# Patient Record
Sex: MALE
[De-identification: names, ages, dates, MRNs, and addresses within clinical notes are randomized per-mention and may not be internally consistent; named-entity substitution may affect disease eponyms.]

---

## 2018-03-17 ENCOUNTER — HOSPITAL ENCOUNTER (EMERGENCY)
Dept: HOSPITAL 90 - EDH | Age: 48
Discharge: HOME | End: 2018-03-17
Payer: COMMERCIAL

## 2018-03-17 DIAGNOSIS — I25.2: ICD-10-CM

## 2018-03-17 DIAGNOSIS — R07.9: Primary | ICD-10-CM

## 2018-03-17 DIAGNOSIS — E78.5: ICD-10-CM

## 2018-03-17 DIAGNOSIS — I25.10: ICD-10-CM

## 2018-03-17 DIAGNOSIS — I10: ICD-10-CM

## 2018-03-17 LAB
BASOPHILS NFR BLD AUTO: 0.2 % (ref 0–5)
CK MB SERPL-MCNC: 2.9 NG/ML (ref 0.5–3.6)
CK SERPL-CCNC: 246 U/L (ref 21–232)
EOSINOPHIL NFR BLD AUTO: 1.4 % (ref 0–8)
ERYTHROCYTE [DISTWIDTH] IN BLOOD BY AUTOMATED COUNT: 14.7 % (ref 11–15.5)
HCT VFR BLD AUTO: 42.4 % (ref 42–54)
LIPASE SERPL-CCNC: 105 U/L (ref 114–286)
LYMPHOCYTES NFR SPEC AUTO: 6.2 % (ref 21–51)
MCH RBC QN AUTO: 28.4 PG (ref 27–33)
MCHC RBC AUTO-ENTMCNC: 33.2 G/DL (ref 32–36)
MCV RBC AUTO: 85.5 FL (ref 79–99)
MONOCYTES NFR BLD AUTO: 6.8 % (ref 3–13)
NEUTROPHILS NFR BLD AUTO: 85.4 % (ref 40–77)
NRBC BLD MANUAL-RTO: 0 % (ref 0–0.19)
PLATELET # BLD AUTO: 238 K/UL (ref 130–400)
RBC # BLD AUTO: 4.96 MIL/UL (ref 4.5–6.2)
WBC # BLD AUTO: 10.1 K/UL (ref 4.8–10.8)

## 2018-03-17 PROCEDURE — 82550 ASSAY OF CK (CPK): CPT

## 2018-03-17 PROCEDURE — 99291 CRITICAL CARE FIRST HOUR: CPT

## 2018-03-17 PROCEDURE — 84484 ASSAY OF TROPONIN QUANT: CPT

## 2018-03-17 PROCEDURE — 36415 COLL VENOUS BLD VENIPUNCTURE: CPT

## 2018-03-17 PROCEDURE — 83690 ASSAY OF LIPASE: CPT

## 2018-03-17 PROCEDURE — 93005 ELECTROCARDIOGRAM TRACING: CPT

## 2018-03-17 PROCEDURE — 96374 THER/PROPH/DIAG INJ IV PUSH: CPT

## 2018-03-17 PROCEDURE — 85025 COMPLETE CBC W/AUTO DIFF WBC: CPT

## 2018-03-17 PROCEDURE — 82553 CREATINE MB FRACTION: CPT

## 2024-12-03 ENCOUNTER — HOSPITAL ENCOUNTER (EMERGENCY)
Dept: HOSPITAL 90 - EDH | Age: 54
Discharge: HOME | End: 2024-12-03
Payer: COMMERCIAL

## 2024-12-03 VITALS — HEIGHT: 69 IN | WEIGHT: 309.97 LBS | BODY MASS INDEX: 45.91 KG/M2

## 2024-12-03 VITALS
HEART RATE: 71 BPM | OXYGEN SATURATION: 100 % | SYSTOLIC BLOOD PRESSURE: 123 MMHG | TEMPERATURE: 98.2 F | DIASTOLIC BLOOD PRESSURE: 76 MMHG | RESPIRATION RATE: 16 BRPM

## 2024-12-03 DIAGNOSIS — I10: ICD-10-CM

## 2024-12-03 DIAGNOSIS — E78.00: ICD-10-CM

## 2024-12-03 DIAGNOSIS — K29.70: ICD-10-CM

## 2024-12-03 DIAGNOSIS — K21.9: Primary | ICD-10-CM

## 2024-12-03 DIAGNOSIS — Z79.82: ICD-10-CM

## 2024-12-03 DIAGNOSIS — Z79.84: ICD-10-CM

## 2024-12-03 DIAGNOSIS — Z79.02: ICD-10-CM

## 2024-12-03 DIAGNOSIS — Z79.899: ICD-10-CM

## 2024-12-03 DIAGNOSIS — E11.9: ICD-10-CM

## 2024-12-03 LAB
BASOPHILS # BLD AUTO: 0.04 K/UL (ref 0–0.2)
BASOPHILS NFR BLD AUTO: 0.7 % (ref 0–5)
BNP SERPL-MCNC: 12 PG/ML (ref 0–100)
BUN SERPL-MCNC: 13 MG/DL (ref 7–18)
CHLORIDE SERPL-SCNC: 105 MMOL/L (ref 101–111)
CK SERPL-CCNC: 160 U/L (ref 21–232)
CO2 SERPL-SCNC: 26 MMOL/L (ref 21–32)
CREAT SERPL-MCNC: 1.1 MG/DL (ref 0.5–1.3)
EOSINOPHIL # BLD AUTO: 0.35 K/UL (ref 0–0.7)
EOSINOPHIL NFR BLD AUTO: 5.9 % (ref 0–8)
ERYTHROCYTE [DISTWIDTH] IN BLOOD BY AUTOMATED COUNT: 14.8 % (ref 11–15.5)
GFR SERPL CREATININE-BSD FRML MDRD: 79 ML/MIN (ref 90–?)
GLUCOSE SERPL-MCNC: 257 MG/DL (ref 70–105)
HCT VFR BLD AUTO: 40.7 % (ref 42–54)
IMM GRANULOCYTES # BLD: 0.05 K/UL (ref 0–1)
LYMPHOCYTES # SPEC AUTO: 0.9 K/UL (ref 1–4.8)
LYMPHOCYTES NFR SPEC AUTO: 14.3 % (ref 21–51)
MCH RBC QN AUTO: 27.7 PG (ref 27–33)
MCHC RBC AUTO-ENTMCNC: 32.4 G/DL (ref 32–36)
MCV RBC AUTO: 85.3 FL (ref 79–99)
MONOCYTES # BLD AUTO: 0.4 K/UL (ref 0.1–1)
MONOCYTES NFR BLD AUTO: 6.6 % (ref 3–13)
NEUTROPHILS # BLD AUTO: 4.3 K/UL (ref 1.8–7.7)
NEUTROPHILS NFR BLD AUTO: 71.7 % (ref 40–77)
NRBC BLD MANUAL-RTO: 0 % (ref 0–0.19)
PLATELET # BLD AUTO: 191 K/UL (ref 130–400)
POTASSIUM SERPL-SCNC: 4.6 MMOL/L (ref 3.5–5.1)
RBC # BLD AUTO: 4.77 MIL/UL (ref 4.5–6.2)
SODIUM SERPL-SCNC: 139 MMOL/L (ref 136–145)
WBC # BLD AUTO: 5.9 K/UL (ref 4.8–10.8)

## 2024-12-03 PROCEDURE — 80048 BASIC METABOLIC PNL TOTAL CA: CPT

## 2024-12-03 PROCEDURE — 84484 ASSAY OF TROPONIN QUANT: CPT

## 2024-12-03 PROCEDURE — 82550 ASSAY OF CK (CPK): CPT

## 2024-12-03 PROCEDURE — 93005 ELECTROCARDIOGRAM TRACING: CPT

## 2024-12-03 PROCEDURE — 71045 X-RAY EXAM CHEST 1 VIEW: CPT

## 2024-12-03 PROCEDURE — 85025 COMPLETE CBC W/AUTO DIFF WBC: CPT

## 2024-12-03 PROCEDURE — 83880 ASSAY OF NATRIURETIC PEPTIDE: CPT

## 2024-12-03 PROCEDURE — 36415 COLL VENOUS BLD VENIPUNCTURE: CPT

## 2024-12-03 PROCEDURE — 99285 EMERGENCY DEPT VISIT HI MDM: CPT

## 2024-12-03 RX ADMIN — ALUMINUM HYDROXIDE, MAGNESIUM HYDROXIDE, AND DIMETHICONE ONE ML: 400; 400; 40 SUSPENSION ORAL at 18:21

## 2024-12-03 RX ADMIN — DEXAMETHASONE INTENSOL ONE ML: 1 SOLUTION, CONCENTRATE ORAL at 18:21

## 2024-12-03 NOTE — [facility]
CHEST 1VW



HISTORY: Chest pain



COMPARISON: 11/1/2024



FINDINGS: A frontal projection of the chest was obtained. No acute

pulmonary infiltrates is seen.  The heart is borderline enlarged. 

Prominent interstitial markings are seen.  No evidence of aortic

calcification is seen.



IMPRESSION:

1. No acute pulmonary infiltrate is seen.

## 2024-12-03 NOTE — EKG
Former[facility]

                                       

Test Date:    2024               Test Time:    13:31:06

Pat Name:     LILLI CAR        Department:   ED

Patient ID:   [id number]           Room:          

Gender:       M                        Technician:   [id number]

:          1970               Requested By: ITALO SIDDIQI

Order Number: [id number]        Reading MD:   Kyree Antonio

                                 Measurements

Intervals                              Axis          

Rate:         84                       P:            35

IN:           170                      QRS:          263

QRSD:         93                       T:            61

QT:           389                                    

QTc:          461                                    

                           Interpretive Statements

Sinus rhythm

Left anterior fascicular block

Compared to ECG 10/27/2024 16:21:17

T-wave abnormality no longer present

Electronically Signed On 2024 19:16:55 CST by Kyree Antonio



Please click the below link to view image of tracing.